# Patient Record
Sex: FEMALE | Race: WHITE | NOT HISPANIC OR LATINO
[De-identification: names, ages, dates, MRNs, and addresses within clinical notes are randomized per-mention and may not be internally consistent; named-entity substitution may affect disease eponyms.]

---

## 2019-12-02 PROBLEM — Z00.00 ENCOUNTER FOR PREVENTIVE HEALTH EXAMINATION: Status: ACTIVE | Noted: 2019-12-02

## 2019-12-09 ENCOUNTER — APPOINTMENT (OUTPATIENT)
Dept: ORTHOPEDIC SURGERY | Facility: CLINIC | Age: 49
End: 2019-12-09

## 2020-09-28 ENCOUNTER — RESULT REVIEW (OUTPATIENT)
Age: 50
End: 2020-09-28

## 2023-02-27 ENCOUNTER — APPOINTMENT (OUTPATIENT)
Dept: ORTHOPEDIC SURGERY | Facility: CLINIC | Age: 53
End: 2023-02-27
Payer: MEDICARE

## 2023-02-27 VITALS — BODY MASS INDEX: 28.25 KG/M2 | WEIGHT: 180 LBS | HEIGHT: 67 IN

## 2023-02-27 DIAGNOSIS — J45.909 UNSPECIFIED ASTHMA, UNCOMPLICATED: ICD-10-CM

## 2023-02-27 DIAGNOSIS — K21.9 GASTRO-ESOPHAGEAL REFLUX DISEASE W/OUT ESOPHAGITIS: ICD-10-CM

## 2023-02-27 DIAGNOSIS — H57.9 UNSPECIFIED DISORDER OF EYE AND ADNEXA: ICD-10-CM

## 2023-02-27 DIAGNOSIS — K59.09 OTHER CONSTIPATION: ICD-10-CM

## 2023-02-27 DIAGNOSIS — F41.9 ANXIETY DISORDER, UNSPECIFIED: ICD-10-CM

## 2023-02-27 PROCEDURE — 72170 X-RAY EXAM OF PELVIS: CPT

## 2023-02-27 PROCEDURE — 99204 OFFICE O/P NEW MOD 45 MIN: CPT

## 2023-02-27 PROCEDURE — 72110 X-RAY EXAM L-2 SPINE 4/>VWS: CPT

## 2023-02-27 RX ORDER — BACLOFEN 5 MG/1
5 TABLET ORAL
Refills: 0 | Status: ACTIVE | COMMUNITY

## 2023-02-27 RX ORDER — FAMOTIDINE 20 MG/1
20 TABLET, FILM COATED ORAL
Refills: 0 | Status: ACTIVE | COMMUNITY

## 2023-02-27 RX ORDER — DIAZEPAM ORAL SOLUTION (CONCENTRATE) 5 MG/ML
5 SOLUTION ORAL
Refills: 0 | Status: ACTIVE | COMMUNITY

## 2023-02-27 NOTE — HISTORY OF PRESENT ILLNESS
[10] : 10 [Dull/Aching] : dull/aching [Sharp] : sharp [de-identified] : 2/27/23: 51 y/o female with LBP for about few years without injury and with progression over the past year.  Pain radiates to both buttocks and legs up to back of knees.   At times down the legs - No parestehsias. pain worse with standing or sitting for liong periods. No weakness. Pain wakes from sleep. No new BB dysfucntion, suffers from Pelvic floor dysfunction. \par \par Chiro care with some help but very temp\par No PT or accupuncture. \par No injection\par no prior spinal surgery \par \par PMHx GERD, Anxiety\par pelvic floor dysfunction \par Denies CA Hx\par \par Xrays today:\par L spine - mild DS at L4-5, mild spondylosis \par AP PELVIS-  negative \par \par MRI l spine 2019 stand up - slight DS at l4-5\par \par \par \par  [] : no [FreeTextEntry1] : L spine  [FreeTextEntry5] : Shea is a 52 year F. Stated  they were supposed to get an MRI for lumbar. stated they had an MRI done it's 2 hernia disc. Stated they have a crocked spine. Pt is in a lot of pain cannot sit for too long. Stated when bending down there;s spasms.

## 2023-02-27 NOTE — DISCUSSION/SUMMARY
[de-identified] : reviewed the case and the imaging \par has a slight spondylolisthesis\par may have some coccydynia related to her pelvic floor dysfunction - rec she get treatment for this \par pending a repeat MRi \par PT a script \par can f/u after that

## 2023-03-17 ENCOUNTER — APPOINTMENT (OUTPATIENT)
Dept: ORTHOPEDIC SURGERY | Facility: CLINIC | Age: 53
End: 2023-03-17
Payer: MEDICARE

## 2023-03-17 PROCEDURE — 99214 OFFICE O/P EST MOD 30 MIN: CPT

## 2023-03-17 NOTE — DISCUSSION/SUMMARY
[de-identified] : reviewed the MRi and the case\par DS at l4-5 - doesn’t look bad enough for surgery at this point \par rec PT \par referral to pain management \par cont with medication as needed\par

## 2023-03-17 NOTE — HISTORY OF PRESENT ILLNESS
[Dull/Aching] : dull/aching [Sharp] : sharp [7] : 7 [9] : 9 [de-identified] : 2/27/23: 53 y/o female with LBP for about few years without injury and with progression over the past year.  Pain radiates to both buttocks and legs up to back of knees.   At times down the legs - No parestehsias. pain worse with standing or sitting for liong periods. No weakness. Pain wakes from sleep. No new BB dysfucntion, suffers from Pelvic floor dysfunction. \par \par Chiro care with some help but very temp\par No PT or accupuncture. \par No injection\par no prior spinal surgery \par \par PMHx GERD, Anxiety\par pelvic floor dysfunction \par Denies CA Hx\par \par Xrays today:\par L spine - mild DS at L4-5, mild spondylosis \par AP PELVIS-  negative \par \par MRI l spine 2019 stand up - slight DS at l4-5\par \par 3/17/23: Here for fu - plan at last was "reviewed the case and the imaging \par has a slight spondylolisthesis\par may have some coccydynia related to her pelvic floor dysfunction - rec she get treatment for this \par pending a repeat MRi \par PT a script \par can f/u after that" - overall the pain remains in the back - cant sleep \par PT is helping \par \par MRi L spine (stand up MRi) - DS at L4-5, right sided kidney cyst\par (we don’t have a report)\par \par She is aware of the kidney cyst \par \par \par \par  [] : no [FreeTextEntry1] : L spine  [FreeTextEntry5] : Shea is a 52 year F. Stated  they were supposed to get an MRI for lumbar. stated they had an MRI done it's 2 hernia disc. Stated they have a crocked spine. Pt is in a lot of pain cannot sit for too long. Stated when bending down there;s spasms.  [de-identified] : MRI, PT

## 2023-04-14 ENCOUNTER — APPOINTMENT (OUTPATIENT)
Dept: PAIN MANAGEMENT | Facility: CLINIC | Age: 53
End: 2023-04-14
Payer: MEDICARE

## 2023-04-14 VITALS — BODY MASS INDEX: 28.25 KG/M2 | HEIGHT: 67 IN | WEIGHT: 180 LBS

## 2023-04-14 PROCEDURE — 99214 OFFICE O/P EST MOD 30 MIN: CPT

## 2023-04-14 NOTE — HISTORY OF PRESENT ILLNESS
[Lower back] : lower back [8] : 8 [Radiating] : radiating [Shooting] : shooting [Constant] : constant [Sleep] : sleep [Nothing helps with pain getting better] : Nothing helps with pain getting better [Sitting] : sitting [Lying in bed] : lying in bed [FreeTextEntry1] : pt states she is having pain in the lower back , the pain goes into the side and shoots into the legs  [] : no [FreeTextEntry6] : pressure  [FreeTextEntry7] : b/l legs

## 2023-04-14 NOTE — PHYSICAL EXAM
[de-identified] : PHYSICAL EXAM\par \par Constitutional: \par Appears well, no apparent distress\par Ability to communicate: Normal\par Respiratory: non-labored breathing\par Skin: no rash noted\par Head: normocephalic, atraumatic\par Neck: no visible thyroid enlargement\par Eyes: extraocular movements intact\par Neurologic: alert and oriented x3\par Psychiatric: normal mood, affect, and behavior\par \par Lumbar Spine: \par Palpation: left and right lumbar paraspinal spasm and left and right lumbar paraspinal tenderness to palpation.\par ROM: Diminished range of motion in all plains.  Patient notes pain with lateral bending to the left and right.\par MMT: Motor exam is 5/5 through out bilateral lower extremities.\par Sensation: Light touch and pain is intact throughout bilateral lower extremities.\par Reflexes: achilles and patella reflexes are intact and  symmetrical.  No sustained clonus.\par Special Testing: Positive kemps maneuver on the left and right side\par \par Assessemnt:\par Lumbar spondylosis (m47.816)\par Myalgia (M79.10)\par \par Plan:\par After discussing various treatment options with the patient including but not limited to oral medications, physical therapy, exercise modalities as well as interventional spinal injections, we have decided with the following plan:\par The patient is presenting with axial lumbar pain that has not responded to three months of conservative therapy including physical therapy or nsaid therapy. The pain is interfering with activities of daily living and functionality.  There is no radicular pain.  The pain is exacerbated by facet loading.  Positive kemps maneuver which is defined by pain reproduction with extension and rotation of the lumbar spine to the affected side.  The patient has not had a vertebral fusion at the levels of the proposed treatment.  There is no unexplained neurologic deficit.  There is no bleeding tendency, unstable medical condition, or systemic infection.  The injection is being performed to diagnose the facet joint as the source of the individuals pain.\par \par The risks, benefits and alternatives of the proposed procedure were explained in detail with the patient.  The risks outlined include but are not limited to infection, bleeding, post dural puncture headache, nerve injury, a temporary increase in pain, failure to resolve symptoms, allergic reaction, symptom recurrence, and possible elevation of blood sugar.  All questions were answered to patient's satisfaction and he/she verbalized an understanding.\par \par Follow up 1-2 weeks post injection foe re-evaluation.\par \par Continue home exercises, stretching, activity modification, physical therapy, and conservative care.\par \par \par \par

## 2023-04-14 NOTE — DISCUSSION/SUMMARY
[de-identified] : I personally reviewed the MRI/CT scan images and agree with the radiologist's report. The radiological findings were discussed with the patient\par \par Patient presents with axial lumbar pain that has not responded to  3 months of conservative therapy including physical therapy or NSAID therapy.  The pain is interfering with activities of daily living and functionality.   There is no radicular pain.   The pain is exacerbated by facet loading.  Positive Kemps maneuver which is defined by pain reproduction with extension and rotation of the lumbar spine to the affected side.  The patient has not had a vertebral fusion at the levels of the proposed treatment.  There is no unexplained neurologic deficit.  There is no history of systemic infection, unstable medical condition, bleeding tendency, or local infection.  The injection is being performed to diagnose the facet joint as the source of the individual's pain.\par \par proceed with bl l3,l4,l5 mbb

## 2023-04-20 ENCOUNTER — NON-APPOINTMENT (OUTPATIENT)
Age: 53
End: 2023-04-20

## 2023-05-05 ENCOUNTER — APPOINTMENT (OUTPATIENT)
Dept: PAIN MANAGEMENT | Facility: CLINIC | Age: 53
End: 2023-05-05
Payer: MEDICARE

## 2023-05-05 PROCEDURE — 64493 INJ PARAVERT F JNT L/S 1 LEV: CPT | Mod: 50

## 2023-05-05 PROCEDURE — J3490M: CUSTOM

## 2023-05-05 PROCEDURE — 64494 INJ PARAVERT F JNT L/S 2 LEV: CPT | Mod: 50

## 2023-05-05 NOTE — PROCEDURE
[FreeTextEntry3] : Date of Service: 05/05/2023 \par \par Account: [id number]\par \par Patient: MIKE GONZALEZ \par \par YOB: 1970\par \par Age: 53 year\par \par \par Surgeon:  Luisito Lewis M.D.\par \par Assistant: None.\par \par Pre-Operative Diagnosis: Spondylosis of lumbar region without myelopathy or radiculopathy\par \par Post Operative Diagnosis:  Spondylosis of lumbar region without myelopathy or radiculopathy\par \par Procedure: Right L3,L4,L5 Medial Branch block \par                    Left L3,L4,L5  Medial Branch block under fluoroscopic guidance\par \par Anesthesia:      MAC\par \par This procedure was carried out using fluoroscopic guidance.  The risks and benefits of the procedure were discussed extensively with the patient.  The consent of the patient was obtained and the following procedure was performed.\par \par  The patient was placed in the prone position.  The patient's back was prepped and draped in a sterile fashion.  The left L4 and L5 lumbar vertebral bodies were identified and the fluoroscope left obliqued to approximately 30 degrees to reveal good "Sadiq-dog" anatomical view.  The junction of the superior articulate process and tranverse process at the L4 and L5 level was then identified and marked. The skin at these target points was then localized using 1 cc of 1% Lidocaine without epinephrine at each injection site.  A spinal needle was then introduced and advanced to the above target points at the junction of the SAP and transverse processes until oss was contacted.  After negative aspiration for heme and CSF, an injectate of 1cc 0.25% marcaine  was injected at each of the two levels. \par \par The right L4 and L5 lumbar vertebral bodies were identified and the fluoroscope right obliqued to approximately 30 degrees to reveal good "Sadiq-dog" anatomical view.  The junction of the superior articulate process and tranverse process at the L4 and L5 level was then identified and marked. The skin at these target points was then localized using 1 cc of 1% Lidocaine without epinephrine at each injection site.  A spinal needle was then introduced and advanced to the above target points at the junction of the SAP and transverse processes until oss was contacted.  After negative aspiration for heme and CSF, an injectate of 1cc 0.25% was injected at each of the two levels. \par \par  Fluoroscope then focused on the bilateral sacral ala on AP view, and marked at these points.  The skin and subcutaneous structures were localized using 1cc of 1.0 % lidocaine without epinephrine.  A spinal needle was then advanced under fluoroscopic guidance until oss was contacted at the ala bilaterally.  After negative aspiration for heme and CSF, an injectate of 1cc 0.25% marcaine was injected at each site.\par \par The needles were then removed and pressure was applied.  Anesthesia personnel were present throughout the procedure, monitoring vitals which were stable throughout.\par \par \par Luisito Lewis M.D.\par

## 2023-05-12 ENCOUNTER — APPOINTMENT (OUTPATIENT)
Dept: PAIN MANAGEMENT | Facility: CLINIC | Age: 53
End: 2023-05-12
Payer: MEDICARE

## 2023-05-12 VITALS — HEIGHT: 67 IN | BODY MASS INDEX: 28.25 KG/M2 | WEIGHT: 180 LBS

## 2023-05-12 PROCEDURE — 99214 OFFICE O/P EST MOD 30 MIN: CPT

## 2023-05-12 RX ORDER — CELECOXIB 100 MG/1
100 CAPSULE ORAL
Qty: 60 | Refills: 0 | Status: DISCONTINUED | COMMUNITY
Start: 2023-02-27 | End: 2023-05-12

## 2023-05-12 NOTE — HISTORY OF PRESENT ILLNESS
[Lower back] : lower back [9] : 9 [Radiating] : radiating [Intermittent] : intermittent [Injection therapy] : injection therapy [FreeTextEntry1] : pt is following up after procedure states it helped her she states when she gets up in the morning she feels it more  [] : no [FreeTextEntry7] : b/l sides  [de-identified] : getting up in the morning

## 2023-05-12 NOTE — ASSESSMENT
[FreeTextEntry1] : BL LS MBB #! with improved rom and adls 50% \par \par s/p  MBB with improvement in sitting/standing tolerance, improvement in ambulation, and improvement in sleep hygiene.   Patients notes pain relief of 50 %.\par

## 2023-05-12 NOTE — DISCUSSION/SUMMARY
[Surgical risks reviewed] : Surgical risks reviewed [de-identified] : will proceed MBB BL LS #2\par \par After discussing various treatment options with the patient including but not limited to oral medications, physical therapy, exercise, modalities as well as interventional spinal injections, we have decided with the following plan:\par I personally reviewed the MRI/CT scan images and agree with the radiologist's report. The radiological findings were discussed with the patient.\par The risks, benefits, contents and alternatives to injection were explained in full to the patient. Risks outlined include but are not limited to infection,sepsis, bleeding, post-dural puncture headache, nerve damage, temporary increase in pain, syncopal episode, failure to resolve symptoms, allergic reaction, symptom recurrence, and elevation of blood sugar in diabetics. Cortisone may cause immunosuppression. Patient understands the risks. All questions were answered. After discussion of options, patient requested an injection. Information regarding the injection was given to the patient. Which medications to stop prior to the injection was explained to the patient as well.\par Follow up in 1-2 weeks post injection for re-evaluation.\par Continue Home exercises, stretching, activity modification, physical therapy, and conservative care.\par Patient is presenting with acute/sub-acute radicular pain with impairment in ADLs and functionality. The pain has not responded to conservative care including nsaid therapy and/or physical therapy. There is no bleeding tendency, unstable medical condition, or systemic infection.\par \par cont PT (1) Other Medications/None

## 2023-05-12 NOTE — PHYSICAL EXAM
[de-identified] : PHYSICAL EXAM\par \par Constitutional: \par Appears well, no apparent distress\par Ability to communicate: Normal\par Respiratory: non-labored breathing\par Skin: no rash noted\par Head: normocephalic, atraumatic\par Neck: no visible thyroid enlargement\par Eyes: extraocular movements intact\par Neurologic: alert and oriented x3\par Psychiatric: normal mood, affect, and behavior\par \par Lumbar Spine: \par Palpation: left and right lumbar paraspinal spasm and left and right lumbar paraspinal tenderness to palpation.\par ROM: Diminished range of motion in all plains.  Patient notes pain with lateral bending to the left and right.\par MMT: Motor exam is 5/5 through out bilateral lower extremities.\par Sensation: Light touch and pain is intact throughout bilateral lower extremities.\par Reflexes: achilles and patella reflexes are intact and  symmetrical.  No sustained clonus.\par Special Testing: Positive kemps maneuver on the left and right side\par \par Assessemnt:\par Lumbar spondylosis (m47.816)\par Myalgia (M79.10)\par \par Plan:\par After discussing various treatment options with the patient including but not limited to oral medications, physical therapy, exercise modalities as well as interventional spinal injections, we have decided with the following plan:\par The patient is presenting with axial lumbar pain that has not responded to three months of conservative therapy including physical therapy or nsaid therapy. The pain is interfering with activities of daily living and functionality.  There is no radicular pain.  The pain is exacerbated by facet loading.  Positive kemps maneuver which is defined by pain reproduction with extension and rotation of the lumbar spine to the affected side.  The patient has not had a vertebral fusion at the levels of the proposed treatment.  There is no unexplained neurologic deficit.  There is no bleeding tendency, unstable medical condition, or systemic infection.  The injection is being performed to diagnose the facet joint as the source of the individuals pain.\par \par The risks, benefits and alternatives of the proposed procedure were explained in detail with the patient.  The risks outlined include but are not limited to infection, bleeding, post dural puncture headache, nerve injury, a temporary increase in pain, failure to resolve symptoms, allergic reaction, symptom recurrence, and possible elevation of blood sugar.  All questions were answered to patient's satisfaction and he/she verbalized an understanding.\par \par Follow up 1-2 weeks post injection foe re-evaluation.\par \par Continue home exercises, stretching, activity modification, physical therapy, and conservative care.\par \par \par \par

## 2023-06-21 ENCOUNTER — APPOINTMENT (OUTPATIENT)
Dept: PAIN MANAGEMENT | Facility: CLINIC | Age: 53
End: 2023-06-21
Payer: MEDICARE

## 2023-06-21 PROCEDURE — 64493 INJ PARAVERT F JNT L/S 1 LEV: CPT | Mod: 50

## 2023-06-21 PROCEDURE — J3490M: CUSTOM

## 2023-06-21 PROCEDURE — 64494 INJ PARAVERT F JNT L/S 2 LEV: CPT | Mod: 50

## 2023-06-23 ENCOUNTER — APPOINTMENT (OUTPATIENT)
Dept: PAIN MANAGEMENT | Facility: CLINIC | Age: 53
End: 2023-06-23

## 2023-06-30 ENCOUNTER — APPOINTMENT (OUTPATIENT)
Dept: PAIN MANAGEMENT | Facility: CLINIC | Age: 53
End: 2023-06-30
Payer: MEDICARE

## 2023-06-30 VITALS — HEIGHT: 67 IN | BODY MASS INDEX: 28.25 KG/M2 | WEIGHT: 180 LBS

## 2023-06-30 DIAGNOSIS — M79.18 MYALGIA, OTHER SITE: ICD-10-CM

## 2023-06-30 PROCEDURE — J3490M: CUSTOM

## 2023-06-30 PROCEDURE — 20552 NJX 1/MLT TRIGGER POINT 1/2: CPT

## 2023-06-30 PROCEDURE — 99214 OFFICE O/P EST MOD 30 MIN: CPT | Mod: 25

## 2023-06-30 RX ORDER — LIDOCAINE 5% 700 MG/1
5 PATCH TOPICAL
Qty: 90 | Refills: 0 | Status: ACTIVE | COMMUNITY
Start: 2023-06-30 | End: 1900-01-01

## 2023-06-30 NOTE — PHYSICAL EXAM
[de-identified] : PHYSICAL EXAM\par \par Constitutional: \par Appears well, no apparent distress\par Ability to communicate: Normal\par Respiratory: non-labored breathing\par Skin: no rash noted\par Head: normocephalic, atraumatic\par Neck: no visible thyroid enlargement\par Eyes: extraocular movements intact\par Neurologic: alert and oriented x3\par Psychiatric: normal mood, affect, and behavior\par \par Lumbar Spine: \par Palpation: left and right lumbar paraspinal spasm and left and right lumbar paraspinal tenderness to palpation.\par ROM: Diminished range of motion in all plains.  Patient notes pain with lateral bending to the left and right.\par MMT: Motor exam is 5/5 through out bilateral lower extremities.\par Sensation: Light touch and pain is intact throughout bilateral lower extremities.\par Reflexes: achilles and patella reflexes are intact and  symmetrical.  No sustained clonus.\par Special Testing: Positive kemps maneuver on the left and right side\par \par Assessemnt:\par Lumbar spondylosis (m47.816)\par Myalgia (M79.10)\par \par Plan:\par After discussing various treatment options with the patient including but not limited to oral medications, physical therapy, exercise modalities as well as interventional spinal injections, we have decided with the following plan:\par The patient is presenting with axial lumbar pain that has not responded to three months of conservative therapy including physical therapy or nsaid therapy. The pain is interfering with activities of daily living and functionality.  There is no radicular pain.  The pain is exacerbated by facet loading.  Positive kemps maneuver which is defined by pain reproduction with extension and rotation of the lumbar spine to the affected side.  The patient has not had a vertebral fusion at the levels of the proposed treatment.  There is no unexplained neurologic deficit.  There is no bleeding tendency, unstable medical condition, or systemic infection.  The injection is being performed to diagnose the facet joint as the source of the individuals pain.\par \par The risks, benefits and alternatives of the proposed procedure were explained in detail with the patient.  The risks outlined include but are not limited to infection, bleeding, post dural puncture headache, nerve injury, a temporary increase in pain, failure to resolve symptoms, allergic reaction, symptom recurrence, and possible elevation of blood sugar.  All questions were answered to patient's satisfaction and he/she verbalized an understanding.\par \par Follow up 1-2 weeks post injection foe re-evaluation.\par \par Continue home exercises, stretching, activity modification, physical therapy, and conservative care.\par \par \par \par

## 2023-06-30 NOTE — HISTORY OF PRESENT ILLNESS
[Lower back] : lower back [10] : 10 [9] : 9 [Radiating] : radiating [Constant] : constant [Sleep] : sleep [Nothing helps with pain getting better] : Nothing helps with pain getting better [Bending forward] : bending forward [Lying in bed] : lying in bed [FreeTextEntry1] : pt is following up after procedure states it helped her she states when she gets up in the morning she feels it more  [] : no [FreeTextEntry7] : b/l sides , tailbone  [de-identified] : getting up in the morning

## 2023-06-30 NOTE — REASON FOR VISIT
[Follow-Up Visit] : a follow-up pain management visit [FreeTextEntry2] : BILATERAL L3,L4,L5 MBB(DOS 06/21/23-MAC)

## 2023-06-30 NOTE — PROCEDURE
[Trigger point 1-2 muscle groups] : trigger point 1-2 muscle groups [Bilateral] : bilaterally of the [Lumbar paraspinal muscle] : lumbar paraspinal muscle [Thoracic paraspinal muscle] : thoracic paraspinal muscle [Pain] : pain [Inflammation] : inflammation [Alcohol] : alcohol [Ethyl Chloride sprayed topically] : ethyl chloride sprayed topically [Sterile technique used] : sterile technique used [___ cc    1%] : Lidocaine ~Vcc of 1%  [___ cc    0.25%] : Bupivacaine (Marcaine) ~Vcc of 0.25%  [___ cc    10mg] : Triamcinolone (Kenalog) ~Vcc of 10 mg  [] : Patient tolerated procedure well [Call if redness, pain or fever occur] : call if redness, pain or fever occur [Risks, benefits, alternatives discussed / Verbal consent obtained] : the risks benefits, and alternatives have been discussed, and verbal consent was obtained

## 2023-08-02 ENCOUNTER — RX RENEWAL (OUTPATIENT)
Age: 53
End: 2023-08-02

## 2023-09-01 ENCOUNTER — APPOINTMENT (OUTPATIENT)
Dept: PAIN MANAGEMENT | Facility: CLINIC | Age: 53
End: 2023-09-01

## 2023-09-05 NOTE — HISTORY OF PRESENT ILLNESS
[Lower back] : lower back [9] : 9 [10] : 10 [Radiating] : radiating [Constant] : constant [Nothing helps with pain getting better] : Nothing helps with pain getting better [Sleep] : sleep [Bending forward] : bending forward [Lying in bed] : lying in bed [FreeTextEntry1] : pt is following up after procedure states it helped her she states when she gets up in the morning she feels it more  [] : no [FreeTextEntry7] : b/l sides , tailbone  [de-identified] : getting up in the morning

## 2023-09-19 ENCOUNTER — APPOINTMENT (OUTPATIENT)
Dept: PAIN MANAGEMENT | Facility: CLINIC | Age: 53
End: 2023-09-19
Payer: MEDICARE

## 2023-09-19 VITALS — BODY MASS INDEX: 28.25 KG/M2 | WEIGHT: 180 LBS | HEIGHT: 67 IN

## 2023-09-19 PROCEDURE — 99213 OFFICE O/P EST LOW 20 MIN: CPT

## 2023-11-01 ENCOUNTER — APPOINTMENT (OUTPATIENT)
Dept: PAIN MANAGEMENT | Facility: CLINIC | Age: 53
End: 2023-11-01
Payer: MEDICARE

## 2023-11-01 PROCEDURE — 62323 NJX INTERLAMINAR LMBR/SAC: CPT

## 2023-11-17 ENCOUNTER — APPOINTMENT (OUTPATIENT)
Dept: PAIN MANAGEMENT | Facility: CLINIC | Age: 53
End: 2023-11-17
Payer: MEDICARE

## 2023-11-17 VITALS — HEIGHT: 67 IN | WEIGHT: 180 LBS | BODY MASS INDEX: 28.25 KG/M2

## 2023-11-17 PROCEDURE — 99213 OFFICE O/P EST LOW 20 MIN: CPT

## 2023-11-17 RX ORDER — LIDOCAINE 5% 700 MG/1
5 PATCH TOPICAL
Qty: 90 | Refills: 2 | Status: ACTIVE | COMMUNITY
Start: 2023-11-17 | End: 1900-01-01

## 2023-11-17 RX ORDER — TIZANIDINE 4 MG/1
4 TABLET ORAL
Qty: 60 | Refills: 0 | Status: ACTIVE | COMMUNITY
Start: 2023-11-17 | End: 1900-01-01

## 2023-12-07 ENCOUNTER — APPOINTMENT (OUTPATIENT)
Dept: NUCLEAR MEDICINE | Facility: HOSPITAL | Age: 53
End: 2023-12-07
Payer: MEDICARE

## 2023-12-15 ENCOUNTER — APPOINTMENT (OUTPATIENT)
Dept: PSYCHIATRY | Facility: CLINIC | Age: 53
End: 2023-12-15
Payer: MEDICARE

## 2023-12-15 DIAGNOSIS — F51.02 ADJUSTMENT INSOMNIA: ICD-10-CM

## 2023-12-15 PROCEDURE — 99205 OFFICE O/P NEW HI 60 MIN: CPT
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
## 2023-12-15 NOTE — PAST MEDICAL HISTORY
[FreeTextEntry1] : GERD on cimetidine 300 TID Spinal stenosis low BP Gastric paresis doing a test to see if she has it on Jan 8 Chronic constipation  Allergy: Celebrex, Omeprazole and Lipitor (All rash)

## 2023-12-15 NOTE — SURGICAL HISTORY
[FreeTextEntry1] : Cataract B/L on Dec 8 Blepharitis Posterior capsulotomy right eye on 6/11/2023 and left eye on 7/26/2023.  Rhinoplasty at age 19 Deviated septum.

## 2023-12-15 NOTE — PLAN
[FreeTextEntry4] : Assessment: Patient is a 54 yo female with h/o NATALIA seen today for medication management. Patient is compliant with the medications, tolerating it well without any side effects. I-STOP was checked without any problems.  PLAN: Increase Zoloft from 25 to 50 mg PO QAM for depression and anxiety Continue Klonopin 0.5 mg PO PRN given by PCP for anxiety Start Trazodone 50 mg PO QHS for insomnia - Discussed risks and benefits of medications including side effects of GI and sexual with SSRI. Alternative strategies including no intervention discussed with patient. Patient consents to current medications as prescribed. - Discussed with patient regarding importance of abstinence and sobriety from alcohol and drugs. Educated about relationship between worsening mood/anxiety symptoms and drug use and improvement of symptoms with abstinence.  - Discussed about unpredictable effects including cardiorespiratory collapse from the combination of illicit drugs and prescribed medications. Patient verbalized understanding. - Patient understands to contact clinic prn with concerns and agrees to call 911 or go to nearest ER if symptoms worsen. - Next appointment was made by the patient in 6-8 weeks Patient was not in any distress.   I spent a total of 60 minutes for today's visit in evaluating and treating the patient as per above, including: preparing for patient's appointment (review of prior documents, [facility] ), performing a medically necessary exam/evaluation, communicating/counseling/educating the patient ordering medications

## 2023-12-15 NOTE — PHYSICAL EXAM
[None] : none [Cooperative] : cooperative [Anxious] : anxious [Flat] : flat [Clear] : clear [Linear/Goal Directed] : linear/goal directed [WNL] : within normal limits [Average] : average [FreeTextEntry2] : ambulates with a cane [de-identified] : fair [de-identified] : fair

## 2023-12-19 ENCOUNTER — APPOINTMENT (OUTPATIENT)
Dept: PAIN MANAGEMENT | Facility: CLINIC | Age: 53
End: 2023-12-19

## 2024-01-12 ENCOUNTER — APPOINTMENT (OUTPATIENT)
Dept: PSYCHIATRY | Facility: CLINIC | Age: 54
End: 2024-01-12
Payer: MEDICARE

## 2024-01-12 PROCEDURE — 99214 OFFICE O/P EST MOD 30 MIN: CPT

## 2024-01-12 RX ORDER — TRAZODONE HYDROCHLORIDE 50 MG/1
50 TABLET ORAL
Qty: 30 | Refills: 0 | Status: ACTIVE | COMMUNITY
Start: 2023-12-15 | End: 1900-01-01

## 2024-01-12 RX ORDER — PROPRANOLOL HYDROCHLORIDE 10 MG/1
10 TABLET ORAL DAILY
Qty: 30 | Refills: 0 | Status: ACTIVE | COMMUNITY
Start: 2024-01-12 | End: 1900-01-01
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
## 2024-01-12 NOTE — PLAN
[FreeTextEntry4] : Assessment: Patient is a 54 yo female with h/o NATALIA seen today for medication management. Patient is compliant with the medications, tolerating it well without any side effects. I-STOP was checked without any problems.  PLAN: Start Propranolol 10 mg PO QD for anxiety Increase Zoloft from 50 to 75 mg PO QAM for depression and anxiety Continue Klonopin 0.5 mg PO PRN given by PCP for anxiety Cotninue Trazodone 50 mg PO QHS for insomnia - Discussed risks and benefits of medications including side effects of GI and sexual with SSRI. Alternative strategies including no intervention discussed with patient. Patient consents to current medications as prescribed. - Discussed with patient regarding importance of abstinence and sobriety from alcohol and drugs. Educated about relationship between worsening mood/anxiety symptoms and drug use and improvement of symptoms with abstinence.  - Discussed about unpredictable effects including cardiorespiratory collapse from the combination of illicit drugs and prescribed medications. Patient verbalized understanding. - Patient understands to contact clinic prn with concerns and agrees to call 911 or go to nearest ER if symptoms worsen. - Next appointment was made by the patient in 6-8 weeks Patient was not in any distress.

## 2024-01-19 ENCOUNTER — APPOINTMENT (OUTPATIENT)
Dept: PAIN MANAGEMENT | Facility: CLINIC | Age: 54
End: 2024-01-19

## 2024-01-29 ENCOUNTER — APPOINTMENT (OUTPATIENT)
Dept: NUCLEAR MEDICINE | Facility: HOSPITAL | Age: 54
End: 2024-01-29

## 2024-02-09 ENCOUNTER — APPOINTMENT (OUTPATIENT)
Dept: PSYCHIATRY | Facility: CLINIC | Age: 54
End: 2024-02-09

## 2024-02-21 ENCOUNTER — APPOINTMENT (OUTPATIENT)
Dept: PSYCHIATRY | Facility: CLINIC | Age: 54
End: 2024-02-21
Payer: MEDICARE

## 2024-02-21 PROCEDURE — 99214 OFFICE O/P EST MOD 30 MIN: CPT

## 2024-02-21 RX ORDER — SERTRALINE 25 MG/1
25 TABLET, FILM COATED ORAL DAILY
Qty: 30 | Refills: 1 | Status: COMPLETED | COMMUNITY
Start: 2024-01-12 | End: 2024-02-21

## 2024-02-21 NOTE — PLAN
[FreeTextEntry4] : Assessment: Patient is a 54 yo female with h/o NATALIA seen today for medication management. Patient is compliant with the medications, tolerating it well without any side effects. I-STOP was checked without any problems.  PLAN: Continue Propranolol 10 mg PO QD for anxiety Increase Zoloft from 75 to100 mg PO QAM for depression and anxiety Continue Klonopin 0.5 mg PO PRN given by PCP for anxiety Continue Trazodone 50 mg PO QHS for insomnia - Discussed risks and benefits of medications including side effects of GI and sexual with SSRI. Alternative strategies including no intervention discussed with patient. Patient consents to current medications as prescribed. - Discussed with patient regarding importance of abstinence and sobriety from alcohol and drugs. Educated about relationship between worsening mood/anxiety symptoms and drug use and improvement of symptoms with abstinence.  - Discussed about unpredictable effects including cardiorespiratory collapse from the combination of illicit drugs and prescribed medications. Patient verbalized understanding. - Patient understands to contact clinic prn with concerns and agrees to call 911 or go to nearest ER if symptoms worsen. - Next appointment was made by the patient in 6-8 weeks Patient was not in any distress.

## 2024-02-21 NOTE — PHYSICAL EXAM
[None] : none [FreeTextEntry2] : ambulates with a cane [Cooperative] : cooperative [Anxious] : anxious [Flat] : flat [Clear] : clear [Linear/Goal Directed] : linear/goal directed [WNL] : within normal limits [Average] : average [de-identified] : fair [de-identified] : fair
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
## 2024-02-22 ENCOUNTER — APPOINTMENT (OUTPATIENT)
Dept: PAIN MANAGEMENT | Facility: CLINIC | Age: 54
End: 2024-02-22

## 2024-03-15 ENCOUNTER — APPOINTMENT (OUTPATIENT)
Dept: PAIN MANAGEMENT | Facility: CLINIC | Age: 54
End: 2024-03-15
Payer: MEDICARE

## 2024-03-15 VITALS — HEIGHT: 67 IN | WEIGHT: 180 LBS | BODY MASS INDEX: 28.25 KG/M2

## 2024-03-15 DIAGNOSIS — M43.16 SPONDYLOLISTHESIS, LUMBAR REGION: ICD-10-CM

## 2024-03-15 PROCEDURE — 99214 OFFICE O/P EST MOD 30 MIN: CPT

## 2024-03-15 NOTE — DISCUSSION/SUMMARY
[de-identified] : I personally reviewed the MRI/CT scan images and agree with the radiologist's report. The radiological findings were discussed with the patient  grade 1 spondy  with facet athrosis  Patient has lumbosacral axial pain that is consistent with facet joint pathology.  A diagnostic temporary block x2 with a local anesthetic  of the medial branch was performed and has resulted in at least a 80% reduction in pain for the duration of the specific local anesthetic effect.  The pain is not radicular and there is absence of nerve root compression.  There is no prior spinal fusion surgery at the level targeted.  The pain has failed to respond to three months of conservative therapy.   proceed with bl l3-5 mb rfa

## 2024-03-15 NOTE — HISTORY OF PRESENT ILLNESS
[Lower back] : lower back [Radiating] : radiating [Constant] : constant [Sleep] : sleep [Nothing helps with pain getting better] : Nothing helps with pain getting better [Bending forward] : bending forward [Lying in bed] : lying in bed [9] : 9 [FreeTextEntry1] : pt states she is having achy/dull pain in the l spine and she has to be careful how she turns her body cause she gets spasms , pt is walking with a cane  [] : no [FreeTextEntry6] : spasm  [FreeTextEntry7] : b/l sides , tailbone  [de-identified] : getting up in the morning , turning body

## 2024-03-20 ENCOUNTER — APPOINTMENT (OUTPATIENT)
Dept: PSYCHIATRY | Facility: CLINIC | Age: 54
End: 2024-03-20

## 2024-03-29 ENCOUNTER — APPOINTMENT (OUTPATIENT)
Dept: PAIN MANAGEMENT | Facility: CLINIC | Age: 54
End: 2024-03-29

## 2024-03-29 NOTE — HISTORY OF PRESENT ILLNESS
[FreeTextEntry1] : pt states she is having achy/dull pain in the l spine and she has to be careful how she turns her body cause she gets spasms , pt is walking with a cane  [] : no [FreeTextEntry6] : spasm  [FreeTextEntry7] : b/l sides , tailbone  [de-identified] : getting up in the morning , turning body

## 2024-04-10 ENCOUNTER — APPOINTMENT (OUTPATIENT)
Dept: PSYCHIATRY | Facility: CLINIC | Age: 54
End: 2024-04-10
Payer: MEDICARE

## 2024-04-10 PROCEDURE — 99214 OFFICE O/P EST MOD 30 MIN: CPT

## 2024-04-10 RX ORDER — DULOXETINE HYDROCHLORIDE 20 MG/1
20 CAPSULE, DELAYED RELEASE PELLETS ORAL DAILY
Qty: 30 | Refills: 0 | Status: COMPLETED | COMMUNITY
Start: 2023-06-30 | End: 2024-04-10

## 2024-04-10 NOTE — PHYSICAL EXAM
[None] : none [FreeTextEntry2] : ambulates with a cane [Cooperative] : cooperative [Anxious] : anxious [Flat] : flat [Clear] : clear [Linear/Goal Directed] : linear/goal directed [WNL] : within normal limits [Average] : average [de-identified] : fair [de-identified] : fair
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
## 2024-04-10 NOTE — PLAN
[FreeTextEntry4] : Assessment: Patient is a 52 yo female with h/o NATALIA seen today for medication management. Patient is compliant with the medications, tolerating it well without any side effects. I-STOP was checked without any problems.  PLAN: Continue Propranolol 10 mg PO QD for anxiety Continue Zoloft 100 mg PO QAM for depression and anxiety Continue Klonopin 0.5 mg PO PRN given by PCP for anxiety Continue Trazodone 50 mg PO QHS for insomnia - Discussed risks and benefits of medications including side effects of GI and sexual with SSRI. Alternative strategies including no intervention discussed with patient. Patient consents to current medications as prescribed. - Discussed with patient regarding importance of abstinence and sobriety from alcohol and drugs. Educated about relationship between worsening mood/anxiety symptoms and drug use and improvement of symptoms with abstinence.  - Discussed about unpredictable effects including cardiorespiratory collapse from the combination of illicit drugs and prescribed medications. Patient verbalized understanding. - Patient understands to contact clinic prn with concerns and agrees to call 911 or go to nearest ER if symptoms worsen. - Next appointment was made by the patient in 3 months Patient was not in any distress.

## 2024-04-17 ENCOUNTER — APPOINTMENT (OUTPATIENT)
Dept: PAIN MANAGEMENT | Facility: CLINIC | Age: 54
End: 2024-04-17
Payer: MEDICARE

## 2024-04-17 DIAGNOSIS — M47.817 SPONDYLOSIS W/OUT MYELOPATHY OR RADICULOPATHY, LUMBOSACRAL REGION: ICD-10-CM

## 2024-04-17 PROCEDURE — 64636Z: CUSTOM | Mod: 59,RT

## 2024-04-17 PROCEDURE — 64635 DESTROY LUMB/SAC FACET JNT: CPT | Mod: LT

## 2024-04-17 NOTE — PROCEDURE
[FreeTextEntry3] : Date of Service: 04/17/2024   Account: [id number]   Patient: MIKE GONZALEZ   YOB: 1970   Age: 53 year     PREOPERATIVE DIAGNOSIS: Spondylosis of lumbar region without myelopathy or radiculopathy        1.             POSTOPERATIVE DIAGNOSIS: Spondylosis of lumbar region without myelopathy or radiculopathy        1.             PROCEDURE:             1) Right L3, L4, L5 and Left L3, L4, L5 medial branch radiofrequency ablation under fluoroscopic guidance.             Anesthesia:  MAC     Risks, benefits and alternatives of the procedure were discussed with the patient after which she agreed to proceed.  Patient was brought into fluoroscopy suite and was placed in prone position with hip support. Back was prepped and draped in a sterile fashion x3. Anesthesia initiated.   Under AP visualization, the right and left sacral ala was identified and marked. Using a 25 gauge  inch needle the skin and subcutaneous structures at this point were localized with 1% Lidocaine using approximately 3 cc's 1% Lidocaine.  After this, a 20 gauge 100mm [place] radiofrequency needle with a 10mm curved tip was inserted and using depth direction depth technique under constant fluoroscopic visualization, the needle was advanced to the sacral ala until os was contacted.   The camera was then redirected under AP view to visualize the right and left L4 and L5 vertebra. The camera was obliqued to approximately 30 degrees to reveal good Sadiq dog anatomical view. The junction of the superior articulate process and transverse process at the L4 and L5 levels  were then identified and marked. Skin and subcutaneous structures were then anesthetized with approximately 3 cc's of 1% Lidocaine at each of these levels. After which a 20 gauge 100mm [place] radiofrequency needle with a 10mm curved tip then advanced until the junction at the SAP and transverse process was met.  The camera was then directed through the lateral view and under constant fluoroscopic visualization, the needle tips were then advanced and confirmed at the junction of the SAP and transverse process.   The stylette for the most cephalad needle at the L4 level was then removed and a [place] radiofrequency probe was then placed inside the needle. After her pedis' were checked at approximately 300 ohm, 50 hertz sensory stimulation was performed.  Patient experienced concordant pain in his low back at approximately 0.3 volts. The voltage was then increased to 1 volt. The patient reported increased low back pain symptoms without any radiation below the knee.  Stimulation was then changed to 2 hertz and increased slowly by .1 volt increments at approximately 0.5 volts, patient began to experience thumping like reproductive pain in his low back. The voltage was then increased to approximately 2.5 volts. Patient experienced increasing thumping without any sensation below his knee. This exact stimulation was then repeated for the L5 needle as well as sacral ala needle with concordant pain and no radiation below the knee. The 6 levels were then anesthetized with approximately 0.5 cc's of 1% Lidocaine. After which each area was then ablated at 80 degrees centigrade for 60 seconds each. Patient felt no pain reproduction during the ablation procedure.  After each of these levels were ablated and injected approximately 1 cc of 0.25% Bupivacaine plus 10 mg of kenalog were then injected before the needles were removed.  Pressure was then applied to the low back. Band-aids were applied.  Patient was brought to the recovery, ambulated on his own after the procedure and reported decreased low back pain.   Anesthesia personnel were present throughout the procedure. Patient was told to apply ice to the low back for 20 minutes on and 20 minutes off for focal symptoms for 24-48 hours. He is to call the office if he had any questions or concerns.     Luisito Lewis M.D.
show

## 2024-05-03 ENCOUNTER — APPOINTMENT (OUTPATIENT)
Dept: PAIN MANAGEMENT | Facility: CLINIC | Age: 54
End: 2024-05-03
Payer: MEDICARE

## 2024-05-03 DIAGNOSIS — M47.816 SPONDYLOSIS W/OUT MYELOPATHY OR RADICULOPATHY, LUMBAR REGION: ICD-10-CM

## 2024-05-03 DIAGNOSIS — M54.16 RADICULOPATHY, LUMBAR REGION: ICD-10-CM

## 2024-05-03 PROCEDURE — 99214 OFFICE O/P EST MOD 30 MIN: CPT

## 2024-05-03 NOTE — ASSESSMENT
[FreeTextEntry1] : Patient presents with axial lumbar pain that has not responded to  3 months of conservative therapy including physical therapy or NSAID therapy.  The pain is interfering with activities of daily living and functionality.   There is no radicular pain.   The pain is exacerbated by facet loading.  Positive Kemps maneuver which is defined by pain reproduction with extension and rotation of the lumbar spine to the affected side.  The patient has not had a vertebral fusion at the levels of the proposed treatment.  There is no unexplained neurologic deficit.  There is no history of systemic infection, unstable medical condition, bleeding tendency, or local infection.  The injection is being performed to diagnose the facet joint as the source of the individual's pain.

## 2024-05-03 NOTE — HISTORY OF PRESENT ILLNESS
[Lower back] : lower back [9] : 9 [Radiating] : radiating [Constant] : constant [Sleep] : sleep [Nothing helps with pain getting better] : Nothing helps with pain getting better [Bending forward] : bending forward [Lying in bed] : lying in bed [FreeTextEntry1] : B/L L3-L5 RFA - 04/17/2024- with 60% relief so far  pt states she is having achy/dull pain in the l spine and she has to be careful how she turns her body cause she gets spasms , pt is walking with a cane  [] : no [FreeTextEntry6] : spasm  [FreeTextEntry7] : b/l sides , tailbone  [de-identified] : getting up in the morning , turning body

## 2024-05-06 ENCOUNTER — OUTPATIENT (OUTPATIENT)
Dept: OUTPATIENT SERVICES | Facility: HOSPITAL | Age: 54
LOS: 1 days | End: 2024-05-06

## 2024-05-06 ENCOUNTER — APPOINTMENT (OUTPATIENT)
Dept: NUCLEAR MEDICINE | Facility: HOSPITAL | Age: 54
End: 2024-05-06

## 2024-05-06 DIAGNOSIS — R10.9 UNSPECIFIED ABDOMINAL PAIN: ICD-10-CM

## 2024-05-06 PROCEDURE — 78264 GASTRIC EMPTYING IMG STUDY: CPT | Mod: 26,MH

## 2024-06-10 RX ORDER — SERTRALINE HYDROCHLORIDE 100 MG/1
100 TABLET, FILM COATED ORAL DAILY
Qty: 30 | Refills: 0 | Status: ACTIVE | COMMUNITY
Start: 2023-12-15 | End: 1900-01-01

## 2024-07-10 ENCOUNTER — APPOINTMENT (OUTPATIENT)
Dept: PSYCHIATRY | Facility: CLINIC | Age: 54
End: 2024-07-10
Payer: MEDICARE

## 2024-07-10 PROCEDURE — 99214 OFFICE O/P EST MOD 30 MIN: CPT

## 2024-07-10 RX ORDER — BUPROPION HYDROCHLORIDE 75 MG/1
75 TABLET, FILM COATED ORAL
Qty: 30 | Refills: 1 | Status: ACTIVE | COMMUNITY
Start: 2024-07-10 | End: 1900-01-01

## 2024-07-26 ENCOUNTER — APPOINTMENT (OUTPATIENT)
Dept: PAIN MANAGEMENT | Facility: CLINIC | Age: 54
End: 2024-07-26
Payer: MEDICARE

## 2024-07-26 VITALS — HEIGHT: 67 IN | BODY MASS INDEX: 28.25 KG/M2 | WEIGHT: 180 LBS

## 2024-07-26 DIAGNOSIS — M47.817 SPONDYLOSIS W/OUT MYELOPATHY OR RADICULOPATHY, LUMBOSACRAL REGION: ICD-10-CM

## 2024-07-26 DIAGNOSIS — M79.18 MYALGIA, OTHER SITE: ICD-10-CM

## 2024-07-26 PROCEDURE — 99214 OFFICE O/P EST MOD 30 MIN: CPT | Mod: 25

## 2024-07-26 PROCEDURE — J3490M: CUSTOM | Mod: NC

## 2024-07-26 PROCEDURE — 20552 NJX 1/MLT TRIGGER POINT 1/2: CPT

## 2024-07-26 NOTE — HISTORY OF PRESENT ILLNESS
[Lower back] : lower back [9] : 9 [Radiating] : radiating [Constant] : constant [Sleep] : sleep [Nothing helps with pain getting better] : Nothing helps with pain getting better [Bending forward] : bending forward [Lying in bed] : lying in bed [10] : 10 [FreeTextEntry1] : B/L L3-L5 RFA - 04/17/2024- with 60% relief so far  pt states she is having achy/dull pain in the l spine and she has to be careful how she turns her body cause she gets spasms , pt is walking with a cane  [] : no [FreeTextEntry6] : spasm  [FreeTextEntry7] : b/l sides , tailbone  [de-identified] : getting up in the morning , turning body

## 2024-07-26 NOTE — PROCEDURE
[FreeTextEntry3] : Trigger Point was performed because of pain inflammation Anesthesia: ethyl chloride sprayed topically.: Lidocaine .1% 2 cc Marcaine:.25% 2cc  kenalog 10mg/cc 2cc :Needle size: 25 gauge 1 1/2inch.  Medication was injected in the right and left Lumbar paraspinal muscles . Patient has tried OTC's including aspirin, Ibuprofen, Aleve etc or prescription NSAIDS, and/or exercises at home and/ or physical therapy without satisfactory response After verbal consent using sterile preparation and technique.The risks, benefits, and alternatives to cortisone injection were explained in full to the patient. Risks outlined include but are not limited to infection, sepsis, bleeding, scarring, skin discoloration, temporary increase in pain, syncopal episode, failure to resolve symptoms, allergic reaction, symptom recurrence, and elevation of blood sugar in diabetics. Patient understood the risks. All questions were answered. After discussion of options, patient requested an injection. Oral informed consent was obtained and sterile prep was done of the injection site. Sterile technique was utilized for the procedure including the preparation of the solutions used for the injection. Patient tolerated the procedure well. Advised to ice the injection site this evening.  Sterile technique used Prep with alcohol locally to site.

## 2024-07-26 NOTE — HISTORY OF PRESENT ILLNESS
[Lower back] : lower back [9] : 9 [Radiating] : radiating [Constant] : constant [Sleep] : sleep [Nothing helps with pain getting better] : Nothing helps with pain getting better [Bending forward] : bending forward [Lying in bed] : lying in bed [10] : 10 [FreeTextEntry1] : B/L L3-L5 RFA - 04/17/2024- with 60% relief so far  pt states she is having achy/dull pain in the l spine and she has to be careful how she turns her body cause she gets spasms , pt is walking with a cane  [] : no [FreeTextEntry6] : spasm  [FreeTextEntry7] : b/l sides , tailbone  [de-identified] : getting up in the morning , turning body

## 2024-07-26 NOTE — PHYSICAL EXAM
[de-identified] : PHYSICAL EXAM  Constitutional:  Appears well, no apparent distress Ability to communicate: Normal Respiratory: non-labored breathing Skin: no rash noted Head: normocephalic, atraumatic Neck: no visible thyroid enlargement Eyes: extraocular movements intact Neurologic: alert and oriented x3 Psychiatric: normal mood, affect, and behavior  Lumbar Spine:  Palpation: left and right lumbar paraspinal spasm and left and right lumbar paraspinal tenderness to palpation. ROM: Diminished range of motion in all plains.  Patient notes pain with lateral bending to the left and right. MMT: Motor exam is 5/5 through out bilateral lower extremities. Sensation: Light touch and pain is intact throughout bilateral lower extremities. Reflexes: achilles and patella reflexes are intact and  symmetrical.  No sustained clonus. Special Testing: Positive kemps maneuver on the left and right side  Assessemnt: Lumbar spondylosis (m47.816) Myalgia (M79.10)  Plan: After discussing various treatment options with the patient including but not limited to oral medications, physical therapy, exercise modalities as well as interventional spinal injections, we have decided with the following plan: The patient is presenting with axial lumbar pain that has not responded to three months of conservative therapy including physical therapy or nsaid therapy. The pain is interfering with activities of daily living and functionality.  There is no radicular pain.  The pain is exacerbated by facet loading.  Positive kemps maneuver which is defined by pain reproduction with extension and rotation of the lumbar spine to the affected side.  The patient has not had a vertebral fusion at the levels of the proposed treatment.  There is no unexplained neurologic deficit.  There is no bleeding tendency, unstable medical condition, or systemic infection.  The injection is being performed to diagnose the facet joint as the source of the individuals pain.  The risks, benefits and alternatives of the proposed procedure were explained in detail with the patient.  The risks outlined include but are not limited to infection, bleeding, post dural puncture headache, nerve injury, a temporary increase in pain, failure to resolve symptoms, allergic reaction, symptom recurrence, and possible elevation of blood sugar.  All questions were answered to patient's satisfaction and he/she verbalized an understanding.  Follow up 1-2 weeks post injection foe re-evaluation.  Continue home exercises, stretching, activity modification, physical therapy, and conservative care.

## 2024-07-26 NOTE — PHYSICAL EXAM
[de-identified] : PHYSICAL EXAM  Constitutional:  Appears well, no apparent distress Ability to communicate: Normal Respiratory: non-labored breathing Skin: no rash noted Head: normocephalic, atraumatic Neck: no visible thyroid enlargement Eyes: extraocular movements intact Neurologic: alert and oriented x3 Psychiatric: normal mood, affect, and behavior  Lumbar Spine:  Palpation: left and right lumbar paraspinal spasm and left and right lumbar paraspinal tenderness to palpation. ROM: Diminished range of motion in all plains.  Patient notes pain with lateral bending to the left and right. MMT: Motor exam is 5/5 through out bilateral lower extremities. Sensation: Light touch and pain is intact throughout bilateral lower extremities. Reflexes: achilles and patella reflexes are intact and  symmetrical.  No sustained clonus. Special Testing: Positive kemps maneuver on the left and right side  Assessemnt: Lumbar spondylosis (m47.816) Myalgia (M79.10)  Plan: After discussing various treatment options with the patient including but not limited to oral medications, physical therapy, exercise modalities as well as interventional spinal injections, we have decided with the following plan: The patient is presenting with axial lumbar pain that has not responded to three months of conservative therapy including physical therapy or nsaid therapy. The pain is interfering with activities of daily living and functionality.  There is no radicular pain.  The pain is exacerbated by facet loading.  Positive kemps maneuver which is defined by pain reproduction with extension and rotation of the lumbar spine to the affected side.  The patient has not had a vertebral fusion at the levels of the proposed treatment.  There is no unexplained neurologic deficit.  There is no bleeding tendency, unstable medical condition, or systemic infection.  The injection is being performed to diagnose the facet joint as the source of the individuals pain.  The risks, benefits and alternatives of the proposed procedure were explained in detail with the patient.  The risks outlined include but are not limited to infection, bleeding, post dural puncture headache, nerve injury, a temporary increase in pain, failure to resolve symptoms, allergic reaction, symptom recurrence, and possible elevation of blood sugar.  All questions were answered to patient's satisfaction and he/she verbalized an understanding.  Follow up 1-2 weeks post injection foe re-evaluation.  Continue home exercises, stretching, activity modification, physical therapy, and conservative care.

## 2024-08-09 ENCOUNTER — APPOINTMENT (OUTPATIENT)
Dept: PSYCHIATRY | Facility: CLINIC | Age: 54
End: 2024-08-09

## 2024-08-09 PROCEDURE — 99214 OFFICE O/P EST MOD 30 MIN: CPT

## 2024-08-09 NOTE — PHYSICAL EXAM
[None] : none [FreeTextEntry2] : ambulates with a cane [Cooperative] : cooperative [Anxious] : anxious [Flat] : flat [Clear] : clear [Linear/Goal Directed] : linear/goal directed [WNL] : within normal limits [Average] : average [de-identified] : fair [de-identified] : fair
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
## 2024-09-20 ENCOUNTER — APPOINTMENT (OUTPATIENT)
Dept: PSYCHIATRY | Facility: CLINIC | Age: 54
End: 2024-09-20

## 2025-05-24 NOTE — PHYSICAL EXAM
[de-identified] : PHYSICAL EXAM  Constitutional:  Appears well, no apparent distress Ability to communicate: Normal Respiratory: non-labored breathing Skin: no rash noted Head: normocephalic, atraumatic Neck: no visible thyroid enlargement Eyes: extraocular movements intact Neurologic: alert and oriented x3 Psychiatric: normal mood, affect, and behavior  Lumbar Spine:  Palpation: left and right lumbar paraspinal spasm and left and right lumbar paraspinal tenderness to palpation. ROM: Diminished range of motion in all plains.  Patient notes pain with lateral bending to the left and right. MMT: Motor exam is 5/5 through out bilateral lower extremities. Sensation: Light touch and pain is intact throughout bilateral lower extremities. Reflexes: achilles and patella reflexes are intact and  symmetrical.  No sustained clonus. Special Testing: Positive kemps maneuver on the left and right side +cane  Assessemnt: Lumbar spondylosis (m47.816) Myalgia (M79.10)  Plan: After discussing various treatment options with the patient including but not limited to oral medications, physical therapy, exercise modalities as well as interventional spinal injections, we have decided with the following plan: The patient is presenting with axial lumbar pain that has not responded to three months of conservative therapy including physical therapy or nsaid therapy. The pain is interfering with activities of daily living and functionality.  There is no radicular pain.  The pain is exacerbated by facet loading.  Positive kemps maneuver which is defined by pain reproduction with extension and rotation of the lumbar spine to the affected side.  The patient has not had a vertebral fusion at the levels of the proposed treatment.  There is no unexplained neurologic deficit.  There is no bleeding tendency, unstable medical condition, or systemic infection.  The injection is being performed to diagnose the facet joint as the source of the individuals pain.  The risks, benefits and alternatives of the proposed procedure were explained in detail with the patient.  The risks outlined include but are not limited to infection, bleeding, post dural puncture headache, nerve injury, a temporary increase in pain, failure to resolve symptoms, allergic reaction, symptom recurrence, and possible elevation of blood sugar.  All questions were answered to patient's satisfaction and he/she verbalized an understanding.  Follow up 1-2 weeks post injection foe re-evaluation.  Continue home exercises, stretching, activity modification, physical therapy, and conservative care.    Monthly or less